# Patient Record
(demographics unavailable — no encounter records)

---

## 2025-04-23 NOTE — PHYSICAL EXAM
[Obese] : obese [Normal S1, S2] : normal S1, S2 [Clear Lung Fields] : clear lung fields [Normal Gait] : normal gait [No Edema] : no edema [Alert and Oriented] : alert and oriented [No Acute Distress] : no acute distress [No Murmur] : no murmur [Good Air Entry] : good air entry

## 2025-04-23 NOTE — HISTORY OF PRESENT ILLNESS
[FreeTextEntry1] : Jovanny Diez is a 51-year-old retired  with a history of hypertension, calcified coronary artery plaque (minimal), premature ventricular complexes, diabetes mellitus, GERD, and obesity who returns for cardiac examination.   He feels well -- no new complaints; sedentary (little to no exercise) but good functional status.  * This visit was conducted as part of ongoing, longitudinal medical care for patient's chronic medical diagnoses and other issues.

## 2025-04-23 NOTE — CARDIOLOGY SUMMARY
[de-identified] : 4/23/2025.  Sinus Rhythm   [de-identified] : 4/26/2023.  Left ventricular endocardium is not well visualized; however, the left ventricular systolic function appears grossly normal.  Left ventricular ejection fraction is estimated at 60 to 65%.  Normal right ventricular size and function. [de-identified] : 5/17/2023.  Coronary calcium score is 12.  Minimal (less than 10%) distal left main stenosis due to small focus of calcified plaque.  Normal LAD, RCA, and circumflex arteries.

## 2025-04-23 NOTE — DISCUSSION/SUMMARY
[With Me] : with me [___ Year(s)] : in [unfilled] year(s) [FreeTextEntry1] :  Coronary artery calcifications: Minimal; no angina.  Hypertension:  Controlled; continue lisinopril-HCTZ and carvedilol.  PVCs: No PVCs present on today's ECG; continue Coreg  Diabetes mellitus: Under the care of an endocrinologist and controlled.  Shortness of breath: Resolved